# Patient Record
Sex: FEMALE | Race: BLACK OR AFRICAN AMERICAN | Employment: STUDENT | URBAN - METROPOLITAN AREA
[De-identification: names, ages, dates, MRNs, and addresses within clinical notes are randomized per-mention and may not be internally consistent; named-entity substitution may affect disease eponyms.]

---

## 2020-12-10 ENCOUNTER — APPOINTMENT (OUTPATIENT)
Dept: CT IMAGING | Age: 20
End: 2020-12-10
Attending: STUDENT IN AN ORGANIZED HEALTH CARE EDUCATION/TRAINING PROGRAM
Payer: OTHER GOVERNMENT

## 2020-12-10 ENCOUNTER — HOSPITAL ENCOUNTER (EMERGENCY)
Age: 20
Discharge: HOME | End: 2020-12-10
Attending: STUDENT IN AN ORGANIZED HEALTH CARE EDUCATION/TRAINING PROGRAM
Payer: OTHER GOVERNMENT

## 2020-12-10 VITALS
RESPIRATION RATE: 16 BRPM | TEMPERATURE: 98.7 F | OXYGEN SATURATION: 98 % | SYSTOLIC BLOOD PRESSURE: 106 MMHG | HEART RATE: 58 BPM | DIASTOLIC BLOOD PRESSURE: 68 MMHG | WEIGHT: 125 LBS

## 2020-12-10 DIAGNOSIS — J36 PERITONSILLAR ABSCESS: ICD-10-CM

## 2020-12-10 DIAGNOSIS — R79.89 ELEVATED LFTS: ICD-10-CM

## 2020-12-10 DIAGNOSIS — B27.90 ACUTE PHARYNGITIS DUE TO INFECTIOUS MONONUCLEOSIS: Primary | ICD-10-CM

## 2020-12-10 LAB
ALBUMIN SERPL-MCNC: 3.4 G/DL (ref 3.5–5)
ALBUMIN/GLOB SERPL: 0.8 {RATIO} (ref 1.1–2.2)
ALP SERPL-CCNC: 190 U/L (ref 45–117)
ALT SERPL-CCNC: 181 U/L (ref 12–78)
ANION GAP SERPL CALC-SCNC: 7 MMOL/L (ref 5–15)
AST SERPL-CCNC: 51 U/L (ref 15–37)
BASOPHILS # BLD: 0 K/UL (ref 0–0.1)
BASOPHILS NFR BLD: 0 % (ref 0–1)
BILIRUB SERPL-MCNC: 0.4 MG/DL (ref 0.2–1)
BUN SERPL-MCNC: 9 MG/DL (ref 6–20)
BUN/CREAT SERPL: 11 (ref 12–20)
CALCIUM SERPL-MCNC: 8.7 MG/DL (ref 8.5–10.1)
CHLORIDE SERPL-SCNC: 103 MMOL/L (ref 97–108)
CO2 SERPL-SCNC: 31 MMOL/L (ref 21–32)
COMMENT, HOLDF: NORMAL
CREAT SERPL-MCNC: 0.79 MG/DL (ref 0.55–1.02)
DIFFERENTIAL METHOD BLD: ABNORMAL
EOSINOPHIL # BLD: 0 K/UL (ref 0–0.4)
EOSINOPHIL NFR BLD: 0 % (ref 0–7)
ERYTHROCYTE [DISTWIDTH] IN BLOOD BY AUTOMATED COUNT: 11.9 % (ref 11.5–14.5)
GLOBULIN SER CALC-MCNC: 4.5 G/DL (ref 2–4)
GLUCOSE SERPL-MCNC: 85 MG/DL (ref 65–100)
HCT VFR BLD AUTO: 35.8 % (ref 35–47)
HETEROPH AB BLD QL IA: POSITIVE
HGB BLD-MCNC: 11.3 G/DL (ref 11.5–16)
IMM GRANULOCYTES # BLD AUTO: 0 K/UL
IMM GRANULOCYTES NFR BLD AUTO: 0 %
LYMPHOCYTES # BLD: 3.7 K/UL (ref 0.8–3.5)
LYMPHOCYTES NFR BLD: 41 % (ref 12–49)
MCH RBC QN AUTO: 29.3 PG (ref 26–34)
MCHC RBC AUTO-ENTMCNC: 31.6 G/DL (ref 30–36.5)
MCV RBC AUTO: 92.7 FL (ref 80–99)
MONOCYTES # BLD: 1.3 K/UL (ref 0–1)
MONOCYTES NFR BLD: 14 % (ref 5–13)
NEUTS SEG # BLD: 4.1 K/UL (ref 1.8–8)
NEUTS SEG NFR BLD: 45 % (ref 32–75)
NRBC # BLD: 0 K/UL (ref 0–0.01)
NRBC BLD-RTO: 0 PER 100 WBC
PLATELET # BLD AUTO: 290 K/UL (ref 150–400)
PMV BLD AUTO: 9.6 FL (ref 8.9–12.9)
POTASSIUM SERPL-SCNC: 3.2 MMOL/L (ref 3.5–5.1)
PROT SERPL-MCNC: 7.9 G/DL (ref 6.4–8.2)
RBC # BLD AUTO: 3.86 M/UL (ref 3.8–5.2)
RBC MORPH BLD: ABNORMAL
S PYO AG THROAT QL: NEGATIVE
SAMPLES BEING HELD,HOLD: NORMAL
SODIUM SERPL-SCNC: 141 MMOL/L (ref 136–145)
WBC # BLD AUTO: 9.1 K/UL (ref 3.6–11)

## 2020-12-10 PROCEDURE — 96375 TX/PRO/DX INJ NEW DRUG ADDON: CPT

## 2020-12-10 PROCEDURE — 74011250636 HC RX REV CODE- 250/636: Performed by: EMERGENCY MEDICINE

## 2020-12-10 PROCEDURE — 99284 EMERGENCY DEPT VISIT MOD MDM: CPT

## 2020-12-10 PROCEDURE — 74011250636 HC RX REV CODE- 250/636: Performed by: STUDENT IN AN ORGANIZED HEALTH CARE EDUCATION/TRAINING PROGRAM

## 2020-12-10 PROCEDURE — 85025 COMPLETE CBC W/AUTO DIFF WBC: CPT

## 2020-12-10 PROCEDURE — 36415 COLL VENOUS BLD VENIPUNCTURE: CPT

## 2020-12-10 PROCEDURE — 87880 STREP A ASSAY W/OPTIC: CPT

## 2020-12-10 PROCEDURE — 87147 CULTURE TYPE IMMUNOLOGIC: CPT

## 2020-12-10 PROCEDURE — 74011000636 HC RX REV CODE- 636: Performed by: RADIOLOGY

## 2020-12-10 PROCEDURE — 86308 HETEROPHILE ANTIBODY SCREEN: CPT

## 2020-12-10 PROCEDURE — 80053 COMPREHEN METABOLIC PANEL: CPT

## 2020-12-10 PROCEDURE — 96365 THER/PROPH/DIAG IV INF INIT: CPT

## 2020-12-10 PROCEDURE — 70491 CT SOFT TISSUE NECK W/DYE: CPT

## 2020-12-10 PROCEDURE — 87070 CULTURE OTHR SPECIMN AEROBIC: CPT

## 2020-12-10 RX ORDER — CLINDAMYCIN PHOSPHATE 600 MG/50ML
600 INJECTION INTRAVENOUS
Status: COMPLETED | OUTPATIENT
Start: 2020-12-10 | End: 2020-12-10

## 2020-12-10 RX ORDER — KETOROLAC TROMETHAMINE 30 MG/ML
30 INJECTION, SOLUTION INTRAMUSCULAR; INTRAVENOUS
Status: COMPLETED | OUTPATIENT
Start: 2020-12-10 | End: 2020-12-10

## 2020-12-10 RX ORDER — SODIUM CHLORIDE 9 MG/ML
1000 INJECTION, SOLUTION INTRAVENOUS ONCE
Status: COMPLETED | OUTPATIENT
Start: 2020-12-10 | End: 2020-12-10

## 2020-12-10 RX ORDER — CLINDAMYCIN HYDROCHLORIDE 300 MG/1
300 CAPSULE ORAL 3 TIMES DAILY
Qty: 30 CAP | Refills: 0 | Status: SHIPPED | OUTPATIENT
Start: 2020-12-10 | End: 2020-12-20

## 2020-12-10 RX ORDER — DEXAMETHASONE SODIUM PHOSPHATE 10 MG/ML
10 INJECTION INTRAMUSCULAR; INTRAVENOUS ONCE
Status: COMPLETED | OUTPATIENT
Start: 2020-12-10 | End: 2020-12-10

## 2020-12-10 RX ORDER — SODIUM CHLORIDE 0.9 % (FLUSH) 0.9 %
10 SYRINGE (ML) INJECTION
Status: COMPLETED | OUTPATIENT
Start: 2020-12-10 | End: 2020-12-10

## 2020-12-10 RX ADMIN — KETOROLAC TROMETHAMINE 30 MG: 30 INJECTION, SOLUTION INTRAMUSCULAR at 14:00

## 2020-12-10 RX ADMIN — SODIUM CHLORIDE 1000 ML: 900 INJECTION, SOLUTION INTRAVENOUS at 14:01

## 2020-12-10 RX ADMIN — DEXAMETHASONE SODIUM PHOSPHATE 10 MG: 10 INJECTION, SOLUTION INTRAMUSCULAR; INTRAVENOUS at 14:00

## 2020-12-10 RX ADMIN — IOPAMIDOL 100 ML: 612 INJECTION, SOLUTION INTRAVENOUS at 14:00

## 2020-12-10 RX ADMIN — Medication 10 ML: at 14:00

## 2020-12-10 RX ADMIN — CLINDAMYCIN PHOSPHATE 600 MG: 600 INJECTION, SOLUTION INTRAVENOUS at 17:40

## 2020-12-10 NOTE — ED NOTES
3:14 PM  Change of shift. Care of patient taken over from Dr. Cosme Zimmerman; H&P reviewed, bedside handoff complete. Pt with mono given toradol and decadron now feeling better. Awaiting CT neck and dispo based upon CT findings. Lydia Humphreys MD    4:33 PM  Updated pt on ct results. D/w Dr. Chelsea Kelley, ENT. Reviewed the CT and history in detail. Reviewed the exam.  He recommends dose of IV clindamycin now and discharged home on 10 days of clindamycin. Given that the patient is feeling better, he can see the patient in clinic tomorrow. Continue the prednisone. He will decide on additional steroids tomorrow. Updated pt on the plan. She is calling now for the appt tomorrow. 5:04 PM  PT HAS appt for tomorrow at 0930 tomorrow with ENT.      5:04 PM  Patient's results have been reviewed with them. Patient and/or family have verbally conveyed their understanding and agreement of the patient's signs, symptoms, diagnosis, treatment and prognosis and additionally agree to follow up as recommended or return to the Emergency Room should their condition change prior to follow-up. Discharge instructions have also been provided to the patient with some educational information regarding their diagnosis as well a list of reasons why they would want to return to the ER prior to their follow-up appointment should their condition change.     Recent Results (from the past 24 hour(s))   CBC WITH AUTOMATED DIFF    Collection Time: 12/10/20  1:52 PM   Result Value Ref Range    WBC 9.1 3.6 - 11.0 K/uL    RBC 3.86 3.80 - 5.20 M/uL    HGB 11.3 (L) 11.5 - 16.0 g/dL    HCT 35.8 35.0 - 47.0 %    MCV 92.7 80.0 - 99.0 FL    MCH 29.3 26.0 - 34.0 PG    MCHC 31.6 30.0 - 36.5 g/dL    RDW 11.9 11.5 - 14.5 %    PLATELET 123 763 - 326 K/uL    MPV 9.6 8.9 - 12.9 FL    NRBC 0.0 0  WBC    ABSOLUTE NRBC 0.00 0.00 - 0.01 K/uL    NEUTROPHILS 45 32 - 75 %    LYMPHOCYTES 41 12 - 49 %    MONOCYTES 14 (H) 5 - 13 %    EOSINOPHILS 0 0 - 7 %    BASOPHILS 0 0 - 1 %    IMMATURE GRANULOCYTES 0 %    ABS. NEUTROPHILS 4.1 1.8 - 8.0 K/UL    ABS. LYMPHOCYTES 3.7 (H) 0.8 - 3.5 K/UL    ABS. MONOCYTES 1.3 (H) 0.0 - 1.0 K/UL    ABS. EOSINOPHILS 0.0 0.0 - 0.4 K/UL    ABS. BASOPHILS 0.0 0.0 - 0.1 K/UL    ABS. IMM. GRANS. 0.0 K/UL    DF SMEAR SCANNED      RBC COMMENTS NORMOCYTIC, NORMOCHROMIC     METABOLIC PANEL, COMPREHENSIVE    Collection Time: 12/10/20  1:52 PM   Result Value Ref Range    Sodium 141 136 - 145 mmol/L    Potassium 3.2 (L) 3.5 - 5.1 mmol/L    Chloride 103 97 - 108 mmol/L    CO2 31 21 - 32 mmol/L    Anion gap 7 5 - 15 mmol/L    Glucose 85 65 - 100 mg/dL    BUN 9 6 - 20 MG/DL    Creatinine 0.79 0.55 - 1.02 MG/DL    BUN/Creatinine ratio 11 (L) 12 - 20      GFR est AA >60 >60 ml/min/1.73m2    GFR est non-AA >60 >60 ml/min/1.73m2    Calcium 8.7 8.5 - 10.1 MG/DL    Bilirubin, total 0.4 0.2 - 1.0 MG/DL    ALT (SGPT) 181 (H) 12 - 78 U/L    AST (SGOT) 51 (H) 15 - 37 U/L    Alk. phosphatase 190 (H) 45 - 117 U/L    Protein, total 7.9 6.4 - 8.2 g/dL    Albumin 3.4 (L) 3.5 - 5.0 g/dL    Globulin 4.5 (H) 2.0 - 4.0 g/dL    A-G Ratio 0.8 (L) 1.1 - 2.2     MONONUCLEOSIS SCREEN    Collection Time: 12/10/20  1:52 PM   Result Value Ref Range    Mononucleosis screen Positive (A) NEG     SAMPLES BEING HELD    Collection Time: 12/10/20  2:07 PM   Result Value Ref Range    SAMPLES BEING HELD 1RED,1BLU,1PST     COMMENT        Add-on orders for these samples will be processed based on acceptable specimen integrity and analyte stability, which may vary by analyte. POC GROUP A STREP    Collection Time: 12/10/20  2:09 PM   Result Value Ref Range    Group A strep (POC) Negative NEG         Ct Neck Soft Tissue W Cont    Result Date: 12/10/2020  INDICATION: Sore throat, evaluate for peritonsillar abscess Exam: CT of the neck is performed with 2.5 mm collimation after the intravenous administration of 100 cc of nonionic IV Optiray-320.  Sagittal and coronal reformatted images were also obtained. CT dose reduction was achieved through use of a standardized protocol tailored for this examination and automatic exposure control for dose modulation. Adaptive statistical iterative reconstruction (ASIR) was utilized. There is no prior study for direct comparison. FINDINGS: The left and right palatine tonsils are enlarged. There is a 1.4 cm x 1.2 cm x 1.5 cm focal fluid collection posterior to the left tonsillar pillar consistent with a peritonsillar abscess. There is trace defined fluid tracking superior to the left tonsil and tracking into the left submandibular space. Mastoid air cells are well pneumatized. The airway is midline and patent. The visualized upper lungs are clear. IMPRESSION: 1.4 cm x 1.2 cm x 1.5 cm focal fluid collection consistent with a peritonsillar abscess.

## 2020-12-10 NOTE — ED PROVIDER NOTES
Patient is a 26-year-old female who is otherwise healthy presenting today with sore throat. She reports that she has had issues with her tonsils for the past month or so, it is gotten worse over the past week. She reports bilateral severe tonsillar pain, left greater than right with significant swelling. She states that she has had change in voice, some difficulty with breathing at times and trouble swallowing although she is able to tolerate secretions. She was seen by a nurse practitioner 4 days ago and started on a prednisone taper which she is currently using however symptoms seem to be getting worse despite this. She has not had fever. No vomiting or diarrhea. No cough or other acute symptoms at this time. History reviewed. No pertinent past medical history. History reviewed. No pertinent surgical history. History reviewed. No pertinent family history.     Social History     Socioeconomic History    Marital status: Not on file     Spouse name: Not on file    Number of children: Not on file    Years of education: Not on file    Highest education level: Not on file   Occupational History    Not on file   Social Needs    Financial resource strain: Not on file    Food insecurity     Worry: Not on file     Inability: Not on file    Transportation needs     Medical: Not on file     Non-medical: Not on file   Tobacco Use    Smoking status: Never Smoker    Smokeless tobacco: Current User   Substance and Sexual Activity    Alcohol use: Not on file    Drug use: Yes     Types: Marijuana    Sexual activity: Not on file   Lifestyle    Physical activity     Days per week: Not on file     Minutes per session: Not on file    Stress: Not on file   Relationships    Social connections     Talks on phone: Not on file     Gets together: Not on file     Attends Denominational service: Not on file     Active member of club or organization: Not on file     Attends meetings of clubs or organizations: Not on file     Relationship status: Not on file    Intimate partner violence     Fear of current or ex partner: Not on file     Emotionally abused: Not on file     Physically abused: Not on file     Forced sexual activity: Not on file   Other Topics Concern    Not on file   Social History Narrative    Not on file         ALLERGIES: Patient has no known allergies. Review of Systems   Constitutional: Negative for chills and fever. HENT: Positive for sore throat, trouble swallowing and voice change. Negative for congestion and rhinorrhea. Eyes: Negative for redness and visual disturbance. Respiratory: Negative for cough and shortness of breath. Cardiovascular: Negative for chest pain and leg swelling. Gastrointestinal: Negative for abdominal pain, diarrhea, nausea and vomiting. Genitourinary: Negative for dysuria, flank pain, frequency, hematuria and urgency. Musculoskeletal: Negative for arthralgias, back pain, myalgias and neck pain. Skin: Negative for rash and wound. Allergic/Immunologic: Negative for immunocompromised state. Neurological: Negative for dizziness and headaches. Vitals:    12/10/20 1312   BP: 123/79   Pulse: 73   Resp: 18   Temp: 99.3 °F (37.4 °C)   SpO2: 99%   Weight: 56.7 kg (125 lb)            Physical Exam  Vitals signs and nursing note reviewed. Constitutional:       General: She is not in acute distress. Appearance: She is well-developed. She is not diaphoretic. HENT:      Head: Normocephalic. Mouth/Throat:      Pharynx: Posterior oropharyngeal erythema present. Tonsils: Tonsillar exudate present. 3+ on the right. 4+ on the left. Comments: Swelling L>R, ?L PTA  Eyes:      General:         Right eye: No discharge. Left eye: No discharge. Pupils: Pupils are equal, round, and reactive to light. Neck:      Musculoskeletal: Normal range of motion and neck supple.    Cardiovascular:      Rate and Rhythm: Normal rate and regular rhythm. Heart sounds: Normal heart sounds. No murmur. No friction rub. No gallop. Pulmonary:      Effort: Pulmonary effort is normal. No respiratory distress. Breath sounds: Normal breath sounds. No stridor. No wheezing or rales. Abdominal:      General: Bowel sounds are normal. There is no distension. Palpations: Abdomen is soft. Tenderness: There is no abdominal tenderness. There is no guarding or rebound. Musculoskeletal: Normal range of motion. General: No deformity. Skin:     General: Skin is warm and dry. Capillary Refill: Capillary refill takes less than 2 seconds. Findings: No rash. Neurological:      Mental Status: She is alert and oriented to person, place, and time. Psychiatric:         Behavior: Behavior normal.          MDM:  20 y.o. female here with 1 mo of sore throat worsening x past few days already on steroids. +mono here. Has markedly enlarged tonsils but breathing ok and tolerating own secretions. Appears well hydrated. Given decadron IV + toradol with improvement. Plan for CT soft tissue neck given asymmetric swelling to eval for PTA. If negative pt likely able to go home. I have discuss return precautions as well as safety instructions regarding mono and splenomegaly w/ contact sorts (no abd pain or splenomegaly currently). Discussed abnormal LFT with pt. Likely 2/2 EBV. No hepatomegaly or splenomegaly on exam. No abd tenderness. 3PM  Change of shift. Care of patient signed over to Dr. Haider Canela. Bedside handoff complete. Awaiting CT neck.       Uriel Nielsen,

## 2020-12-10 NOTE — ED NOTES
Pt discharged home with parent/guardian. Pt acting age appropriately, respirations regular and unlabored, cap refill less than two seconds. Parent/guardian verbalized understanding of discharge paperwork and has no further questions at this time. Patient given discharge instructions. Patient ambulatory out of the department. Education:  Discussed follow up with ENT tomorrow. Return to ED for worsening symptoms. Instructed patient to complete full course of antibiotics. Reassessment:  Patient states decrease in throat pain at time of discharge.

## 2020-12-10 NOTE — ED NOTES
Dr. Livier Blanca at the bedside to explain plan of care to patient. ENT, Dr. Douglas Mcconnell, to call back after out of a procedure.

## 2020-12-10 NOTE — DISCHARGE INSTRUCTIONS
RETURN IF WORSENING PAIN, TROUBLE SWALLOWING, TROUBLE BREATHING, CHANGE IN VOICE, OR TROUBLE OPENING YOUR MOUTH    CONTINUE YOUR STEROIDS AS PRESCRIBED    Take the antibiotics clindamycin as prescribed.       Keep the appointment for tomorrow at 80 AM with ENT    NO CONTACT UNTIL 2 WEEKS AFTER RESOLUTION OF SYMPTOMS

## 2020-12-11 NOTE — ED NOTES
Patient called stating she was at the pharmacy and she does not have her prescription. Asked that she have pharmacist call ED and her prescription for clindamycin will be called in. Script  for clindamycin 300 mg capsules 1 cap 3 times daily x10 days phoned into [address] at number area code [phone]. Patient states to me that she saw ENT already today who informed her to continue the medication she is on and arranged follow-up next week. Patient states she does not need prednisone as she is still on the taper which was prescribed by someone else. She states she is feeling better and is able to drink without problem and handling her secretions well.   Patient spoke easily without problem on phone

## 2020-12-12 LAB
BACTERIA SPEC CULT: ABNORMAL
BACTERIA SPEC CULT: ABNORMAL
SERVICE CMNT-IMP: ABNORMAL

## 2021-03-03 ENCOUNTER — APPOINTMENT (OUTPATIENT)
Dept: GENERAL RADIOLOGY | Age: 21
End: 2021-03-03
Attending: PHYSICIAN ASSISTANT
Payer: OTHER GOVERNMENT

## 2021-03-03 ENCOUNTER — HOSPITAL ENCOUNTER (EMERGENCY)
Age: 21
Discharge: HOME | End: 2021-03-03
Attending: STUDENT IN AN ORGANIZED HEALTH CARE EDUCATION/TRAINING PROGRAM
Payer: OTHER GOVERNMENT

## 2021-03-03 VITALS
HEART RATE: 106 BPM | WEIGHT: 126.54 LBS | DIASTOLIC BLOOD PRESSURE: 86 MMHG | TEMPERATURE: 98.6 F | RESPIRATION RATE: 18 BRPM | OXYGEN SATURATION: 100 % | SYSTOLIC BLOOD PRESSURE: 127 MMHG

## 2021-03-03 DIAGNOSIS — R10.2 PELVIC PAIN: Primary | ICD-10-CM

## 2021-03-03 DIAGNOSIS — R55 SYNCOPE AND COLLAPSE: ICD-10-CM

## 2021-03-03 LAB
APPEARANCE UR: ABNORMAL
BACTERIA URNS QL MICRO: NEGATIVE /HPF
BILIRUB UR QL: NEGATIVE
CLUE CELLS VAG QL WET PREP: NORMAL
COLOR UR: ABNORMAL
EPITH CASTS URNS QL MICRO: ABNORMAL /LPF
GLUCOSE UR STRIP.AUTO-MCNC: NEGATIVE MG/DL
HCG UR QL: NEGATIVE
HGB UR QL STRIP: NEGATIVE
KETONES UR QL STRIP.AUTO: ABNORMAL MG/DL
KOH PREP SPEC: NORMAL
LEUKOCYTE ESTERASE UR QL STRIP.AUTO: ABNORMAL
MUCOUS THREADS URNS QL MICRO: ABNORMAL /LPF
NITRITE UR QL STRIP.AUTO: NEGATIVE
PH UR STRIP: 5.5 [PH] (ref 5–8)
PROT UR STRIP-MCNC: ABNORMAL MG/DL
RBC #/AREA URNS HPF: ABNORMAL /HPF (ref 0–5)
SERVICE CMNT-IMP: NORMAL
SP GR UR REFRACTOMETRY: >1.03
T VAGINALIS VAG QL WET PREP: NORMAL
UR CULT HOLD, URHOLD: NORMAL
UROBILINOGEN UR QL STRIP.AUTO: 0.2 EU/DL (ref 0.2–1)
WBC URNS QL MICRO: ABNORMAL /HPF (ref 0–4)

## 2021-03-03 PROCEDURE — 87210 SMEAR WET MOUNT SALINE/INK: CPT

## 2021-03-03 PROCEDURE — 99284 EMERGENCY DEPT VISIT MOD MDM: CPT

## 2021-03-03 PROCEDURE — 81025 URINE PREGNANCY TEST: CPT

## 2021-03-03 PROCEDURE — 93005 ELECTROCARDIOGRAM TRACING: CPT

## 2021-03-03 PROCEDURE — 81001 URINALYSIS AUTO W/SCOPE: CPT

## 2021-03-03 PROCEDURE — 71046 X-RAY EXAM CHEST 2 VIEWS: CPT

## 2021-03-03 NOTE — ED TRIAGE NOTES
TRIAGE: per pt \"I was having sex last night and he hit my cervix and now my body hurts and I'm having really bad cramps. \" Advil taken last at 10:30am with no relief.

## 2021-03-03 NOTE — ED PROVIDER NOTES
69-year-old female presenting to the ED for multiple complaints. Patient reports that last night during intercourse \"he hit my cervix,\" patient notes that she almost immediately had new onset of diffuse cramping pain, then had nausea and diarrhea, notes that she was squatting down in the bathroom about to vomit when she felt lightheaded and passed out. States that this is never happened to her before. No chest pain or shortness of breath. Denies history of VTE, recent immobilization, exogenous estrogen use, unilateral leg pain/swelling. Patient notes that since then she has had a small amount of spotting and some mild cramping. States that it hurt to urinate today but thinks that was because of the sex that she had last night. Denies any fever. No other concerns. Past medical history: Denies  Past surgical history: Possible umbilical hernia repair at age 10  Social history: Non-smoker. Occasional alcohol use. [employer] student. History reviewed. No pertinent past medical history. History reviewed. No pertinent surgical history. History reviewed. No pertinent family history.     Social History     Socioeconomic History    Marital status: SINGLE     Spouse name: Not on file    Number of children: Not on file    Years of education: Not on file    Highest education level: Not on file   Occupational History    Not on file   Social Needs    Financial resource strain: Not on file    Food insecurity     Worry: Not on file     Inability: Not on file    Transportation needs     Medical: Not on file     Non-medical: Not on file   Tobacco Use    Smoking status: Never Smoker    Smokeless tobacco: Current User   Substance and Sexual Activity    Alcohol use: Yes     Comment: once a week     Drug use: Yes     Types: Marijuana    Sexual activity: Yes     Birth control/protection: Condom   Lifestyle    Physical activity     Days per week: Not on file     Minutes per session: Not on file    Stress: Not on file   Relationships    Social connections     Talks on phone: Not on file     Gets together: Not on file     Attends Buddhist service: Not on file     Active member of club or organization: Not on file     Attends meetings of clubs or organizations: Not on file     Relationship status: Not on file    Intimate partner violence     Fear of current or ex partner: Not on file     Emotionally abused: Not on file     Physically abused: Not on file     Forced sexual activity: Not on file   Other Topics Concern    Not on file   Social History Narrative    Not on file         ALLERGIES: Patient has no known allergies. Review of Systems   Constitutional: Negative for fever. HENT: Negative for facial swelling. Respiratory: Negative for shortness of breath. Cardiovascular: Negative for chest pain. Gastrointestinal: Negative for vomiting. Genitourinary: Positive for pelvic pain. Skin: Negative for wound. Neurological: Negative for syncope. All other systems reviewed and are negative. Vitals:    03/03/21 1644 03/03/21 1647   BP:  127/86   Pulse:  (!) 106   Resp:  18   Temp:  98.6 °F (37 °C)   SpO2:  100%   Weight: 57.4 kg (126 lb 8.7 oz) 57.4 kg (126 lb 8.7 oz)            Physical Exam  Vitals signs and nursing note reviewed. Constitutional:       General: She is not in acute distress. Appearance: She is well-developed. Comments: Pleasant, well-appearing black female   HENT:      Head: Normocephalic and atraumatic. Right Ear: External ear normal.      Left Ear: External ear normal.   Eyes:      General: No scleral icterus. Conjunctiva/sclera: Conjunctivae normal.   Neck:      Musculoskeletal: Neck supple. Trachea: No tracheal deviation. Cardiovascular:      Rate and Rhythm: Normal rate and regular rhythm. Heart sounds: Normal heart sounds. No murmur. No friction rub. No gallop.     Pulmonary:      Effort: Pulmonary effort is normal. No respiratory distress. Breath sounds: Normal breath sounds. No stridor. No wheezing. Abdominal:      General: There is no distension. Palpations: Abdomen is soft. Comments: Very mild lower abdominal tenderness   Genitourinary:     Comments: Small amount of white discharge  No cervical motion tenderness or cervicitis  No adnexal tenderness  Musculoskeletal: Normal range of motion. Skin:     General: Skin is warm and dry. Neurological:      Mental Status: She is alert and oriented to person, place, and time. Psychiatric:         Behavior: Behavior normal.          MDM  Number of Diagnoses or Management Options  Pelvic pain  Syncope and collapse  Diagnosis management comments: 18-year-old female presenting to the ED for multiple complaints, had new onset of pelvic pain last night during intercourse, subsequently had vomiting, diarrhea, while squatting down to throw up passed out. No PE risk factors. Mild lower abdominal tenderness on exam.  Will perform pelvic exam, check EKG and chest x-ray, hCG. Reassuring work-up, physical exam, vital signs. Discussed symptomatic treatment at home with patient, GYN follow-up, return precautions given.        Amount and/or Complexity of Data Reviewed  Clinical lab tests: ordered and reviewed  Tests in the radiology section of CPT®: ordered and reviewed  Discuss the patient with other providers: yes (Dr. Sena Freeman ED attending)           Procedures

## 2021-03-04 LAB
ATRIAL RATE: 73 BPM
CALCULATED P AXIS, ECG09: 51 DEGREES
CALCULATED R AXIS, ECG10: 69 DEGREES
CALCULATED T AXIS, ECG11: 49 DEGREES
DIAGNOSIS, 93000: NORMAL
P-R INTERVAL, ECG05: 166 MS
Q-T INTERVAL, ECG07: 340 MS
QRS DURATION, ECG06: 70 MS
QTC CALCULATION (BEZET), ECG08: 374 MS
VENTRICULAR RATE, ECG03: 73 BPM

## 2021-03-04 NOTE — ED NOTES
Pt discharged home . Pt acting age appropriately. Respirations regular and unlabored. Skin, pink, dry, and warm. No further complaints at this time. Patient verbalized an understanding of discharge paperwork and has no further questions at this time. Education provided on continuation of care, follow up care with GYN, and tylenol and motrin prn medication administration. Patient able to provide teach back about discharge instructions.

## 2021-05-03 NOTE — DISCHARGE INSTRUCTIONS
Problem: Depressed Mood (Adult/Pediatric)  Goal: *STG: Participates in treatment plan  Outcome: Progressing Towards Goal  Affect is brighter. Pt participates in therapeutic activities. Return for new or worsening symptoms. Follow up with gynecology.

## 2023-02-23 ENCOUNTER — TRANSCRIBE ORDER (OUTPATIENT)
Dept: SCHEDULING | Age: 23
End: 2023-02-23

## 2023-02-23 DIAGNOSIS — H93.A9 PULSATILE TINNITUS, UNSPECIFIED EAR: Primary | ICD-10-CM

## 2023-04-24 DIAGNOSIS — H93.A9 PULSATILE TINNITUS, UNSPECIFIED EAR: Primary | ICD-10-CM
